# Patient Record
Sex: MALE | Race: OTHER | NOT HISPANIC OR LATINO | Employment: UNEMPLOYED | RURAL
[De-identification: names, ages, dates, MRNs, and addresses within clinical notes are randomized per-mention and may not be internally consistent; named-entity substitution may affect disease eponyms.]

---

## 2023-08-10 ENCOUNTER — OFFICE VISIT (OUTPATIENT)
Dept: FAMILY MEDICINE | Facility: CLINIC | Age: 33
End: 2023-08-10

## 2023-08-10 VITALS
OXYGEN SATURATION: 95 % | WEIGHT: 97.81 LBS | RESPIRATION RATE: 19 BRPM | SYSTOLIC BLOOD PRESSURE: 100 MMHG | BODY MASS INDEX: 13.69 KG/M2 | HEART RATE: 113 BPM | HEIGHT: 71 IN | TEMPERATURE: 98 F | DIASTOLIC BLOOD PRESSURE: 70 MMHG

## 2023-08-10 DIAGNOSIS — K59.00 CONSTIPATION, UNSPECIFIED CONSTIPATION TYPE: ICD-10-CM

## 2023-08-10 DIAGNOSIS — R05.3 CHRONIC COUGH: ICD-10-CM

## 2023-08-10 DIAGNOSIS — K08.9 POOR DENTITION: ICD-10-CM

## 2023-08-10 DIAGNOSIS — R64 CACHEXIA: ICD-10-CM

## 2023-08-10 DIAGNOSIS — R06.02 SOB (SHORTNESS OF BREATH): Primary | ICD-10-CM

## 2023-08-10 LAB
ALBUMIN SERPL BCP-MCNC: 4.2 G/DL (ref 3.5–5)
ALBUMIN/GLOB SERPL: 1.3 {RATIO}
ALP SERPL-CCNC: 59 U/L (ref 45–115)
ALT SERPL W P-5'-P-CCNC: 30 U/L (ref 16–61)
ANION GAP SERPL CALCULATED.3IONS-SCNC: 9 MMOL/L (ref 7–16)
AST SERPL W P-5'-P-CCNC: 17 U/L (ref 15–37)
BASOPHILS # BLD AUTO: 0.05 K/UL (ref 0–0.2)
BASOPHILS NFR BLD AUTO: 0.9 % (ref 0–1)
BILIRUB SERPL-MCNC: 0.5 MG/DL (ref ?–1.2)
BUN SERPL-MCNC: 11 MG/DL (ref 7–18)
BUN/CREAT SERPL: 10 (ref 6–20)
CALCIUM SERPL-MCNC: 8.9 MG/DL (ref 8.5–10.1)
CEA SERPL-MCNC: 3.6 NG/ML (ref 0–3)
CHLORIDE SERPL-SCNC: 107 MMOL/L (ref 98–107)
CO2 SERPL-SCNC: 32 MMOL/L (ref 21–32)
CREAT SERPL-MCNC: 1.05 MG/DL (ref 0.7–1.3)
DIFFERENTIAL METHOD BLD: ABNORMAL
EGFR (NO RACE VARIABLE) (RUSH/TITUS): 96 ML/MIN/1.73M2
EOSINOPHIL # BLD AUTO: 0.08 K/UL (ref 0–0.5)
EOSINOPHIL NFR BLD AUTO: 1.4 % (ref 1–4)
ERYTHROCYTE [DISTWIDTH] IN BLOOD BY AUTOMATED COUNT: 12.8 % (ref 11.5–14.5)
GLOBULIN SER-MCNC: 3.3 G/DL (ref 2–4)
GLUCOSE SERPL-MCNC: 96 MG/DL (ref 74–106)
HCT VFR BLD AUTO: 50.2 % (ref 40–54)
HGB BLD-MCNC: 14.9 G/DL (ref 13.5–18)
HIV 1+O+2 AB SERPL QL: NORMAL
IMM GRANULOCYTES # BLD AUTO: 0.01 K/UL (ref 0–0.04)
IMM GRANULOCYTES NFR BLD: 0.2 % (ref 0–0.4)
LYMPHOCYTES # BLD AUTO: 1.7 K/UL (ref 1–4.8)
LYMPHOCYTES NFR BLD AUTO: 29.5 % (ref 27–41)
MCH RBC QN AUTO: 28.2 PG (ref 27–31)
MCHC RBC AUTO-ENTMCNC: 29.7 G/DL (ref 32–36)
MCV RBC AUTO: 94.9 FL (ref 80–96)
MONOCYTES # BLD AUTO: 0.42 K/UL (ref 0–0.8)
MONOCYTES NFR BLD AUTO: 7.3 % (ref 2–6)
MPC BLD CALC-MCNC: 8.6 FL (ref 9.4–12.4)
NEUTROPHILS # BLD AUTO: 3.51 K/UL (ref 1.8–7.7)
NEUTROPHILS NFR BLD AUTO: 60.7 % (ref 53–65)
NRBC # BLD AUTO: 0 X10E3/UL
NRBC, AUTO (.00): 0 %
NT-PROBNP SERPL-MCNC: 76 PG/ML (ref 1–125)
PLATELET # BLD AUTO: 251 K/UL (ref 150–400)
POTASSIUM SERPL-SCNC: 3.8 MMOL/L (ref 3.5–5.1)
PROT SERPL-MCNC: 7.5 G/DL (ref 6.4–8.2)
RBC # BLD AUTO: 5.29 M/UL (ref 4.6–6.2)
SODIUM SERPL-SCNC: 144 MMOL/L (ref 136–145)
T4 FREE SERPL-MCNC: 1.21 NG/DL (ref 0.76–1.46)
TSH SERPL DL<=0.005 MIU/L-ACNC: 0.09 UIU/ML (ref 0.36–3.74)
WBC # BLD AUTO: 5.77 K/UL (ref 4.5–11)

## 2023-08-10 PROCEDURE — 80053 COMPREHENSIVE METABOLIC PANEL: ICD-10-PCS | Mod: ,,, | Performed by: CLINICAL MEDICAL LABORATORY

## 2023-08-10 PROCEDURE — 99204 OFFICE O/P NEW MOD 45 MIN: CPT | Mod: ,,, | Performed by: NURSE PRACTITIONER

## 2023-08-10 PROCEDURE — 84443 THYROID PANEL: ICD-10-PCS | Mod: ,,, | Performed by: CLINICAL MEDICAL LABORATORY

## 2023-08-10 PROCEDURE — 84439 ASSAY OF FREE THYROXINE: CPT | Mod: ,,, | Performed by: CLINICAL MEDICAL LABORATORY

## 2023-08-10 PROCEDURE — 85025 COMPLETE CBC W/AUTO DIFF WBC: CPT | Mod: ,,, | Performed by: CLINICAL MEDICAL LABORATORY

## 2023-08-10 PROCEDURE — 80053 COMPREHEN METABOLIC PANEL: CPT | Mod: ,,, | Performed by: CLINICAL MEDICAL LABORATORY

## 2023-08-10 PROCEDURE — 87389 HIV 1 / 2 ANTIBODY: ICD-10-PCS | Mod: ,,, | Performed by: CLINICAL MEDICAL LABORATORY

## 2023-08-10 PROCEDURE — 82378 CEA: ICD-10-PCS | Mod: ,,, | Performed by: CLINICAL MEDICAL LABORATORY

## 2023-08-10 PROCEDURE — 83880 ASSAY OF NATRIURETIC PEPTIDE: CPT | Mod: ,,, | Performed by: CLINICAL MEDICAL LABORATORY

## 2023-08-10 PROCEDURE — 82378 CARCINOEMBRYONIC ANTIGEN: CPT | Mod: ,,, | Performed by: CLINICAL MEDICAL LABORATORY

## 2023-08-10 PROCEDURE — 99204 PR OFFICE/OUTPT VISIT, NEW, LEVL IV, 45-59 MIN: ICD-10-PCS | Mod: ,,, | Performed by: NURSE PRACTITIONER

## 2023-08-10 PROCEDURE — 84443 ASSAY THYROID STIM HORMONE: CPT | Mod: ,,, | Performed by: CLINICAL MEDICAL LABORATORY

## 2023-08-10 PROCEDURE — 84439 THYROID PANEL: ICD-10-PCS | Mod: ,,, | Performed by: CLINICAL MEDICAL LABORATORY

## 2023-08-10 PROCEDURE — 87389 HIV-1 AG W/HIV-1&-2 AB AG IA: CPT | Mod: ,,, | Performed by: CLINICAL MEDICAL LABORATORY

## 2023-08-10 PROCEDURE — 85025 CBC WITH DIFFERENTIAL: ICD-10-PCS | Mod: ,,, | Performed by: CLINICAL MEDICAL LABORATORY

## 2023-08-10 PROCEDURE — 83880 NT-PRO NATRIURETIC PEPTIDE: ICD-10-PCS | Mod: ,,, | Performed by: CLINICAL MEDICAL LABORATORY

## 2023-08-10 RX ORDER — ALBUTEROL SULFATE 90 UG/1
2 AEROSOL, METERED RESPIRATORY (INHALATION) EVERY 6 HOURS PRN
Qty: 18 G | Refills: 5 | Status: SHIPPED | OUTPATIENT
Start: 2023-08-10 | End: 2024-08-09

## 2023-08-10 RX ORDER — ALBUTEROL SULFATE 90 UG/1
2 AEROSOL, METERED RESPIRATORY (INHALATION) EVERY 6 HOURS PRN
Qty: 18 G | Refills: 0 | Status: SHIPPED | OUTPATIENT
Start: 2023-08-10 | End: 2023-08-10

## 2023-08-10 RX ORDER — POLYETHYLENE GLYCOL 3350 17 G/17G
17 POWDER, FOR SOLUTION ORAL NIGHTLY
Qty: 850 G | Refills: 11 | Status: SHIPPED | OUTPATIENT
Start: 2023-08-10

## 2023-08-10 RX ORDER — PROMETHAZINE HYDROCHLORIDE AND DEXTROMETHORPHAN HYDROBROMIDE 6.25; 15 MG/5ML; MG/5ML
5 SYRUP ORAL EVERY 6 HOURS PRN
Qty: 150 ML | Refills: 0 | Status: SHIPPED | OUTPATIENT
Start: 2023-08-10 | End: 2023-08-20

## 2023-08-10 RX ORDER — PROMETHAZINE HYDROCHLORIDE AND DEXTROMETHORPHAN HYDROBROMIDE 6.25; 15 MG/5ML; MG/5ML
5 SYRUP ORAL EVERY 6 HOURS PRN
Qty: 150 ML | Refills: 0 | Status: SHIPPED | OUTPATIENT
Start: 2023-08-10 | End: 2023-08-10

## 2023-08-10 NOTE — PROGRESS NOTES
"Subjective:       Patient ID: Shakir Coreas is a 32 y.o. male.    Chief Complaint: Cough and Shortness of Breath (When walking or carry something. Has been going on for a while.)    Presents to clinic as above. States s/s for over 3 months. No hx of COPD or asthma. He does not smoke. He is very underweight. Denies weight loss. States has always been thin. He is 5"11 with a weight of 97 pounds. Denies blood in stool. No abd pain. No N/V/D.       Review of Systems   Constitutional:  Positive for malaise/fatigue. Negative for chills and fever.   HENT:  Positive for sore throat. Negative for congestion, ear pain and sinus pain.    Respiratory:  Positive for cough and shortness of breath. Negative for hemoptysis, sputum production and wheezing.    Cardiovascular: Negative.    Gastrointestinal: Negative.    Genitourinary: Negative.    Musculoskeletal: Negative.    Neurological: Negative.           Reviewed family, medical, surgical, and social history.    Objective:      /70 (BP Location: Right arm, Patient Position: Standing, BP Method: Medium (Manual))   Pulse (!) 113   Temp 98.2 °F (36.8 °C)   Resp 19   Ht 5' 11" (1.803 m)   Wt 44.4 kg (97 lb 12.8 oz)   SpO2 95%   BMI 13.64 kg/m²   Physical Exam  Vitals and nursing note reviewed.   Constitutional:       General: He is not in acute distress.     Appearance: Normal appearance. He is not ill-appearing, toxic-appearing or diaphoretic.      Comments: Cachexia     HENT:      Head: Normocephalic.      Right Ear: Tympanic membrane, ear canal and external ear normal. There is no impacted cerumen.      Left Ear: Tympanic membrane, ear canal and external ear normal. There is no impacted cerumen.      Nose: No congestion or rhinorrhea.      Mouth/Throat:      Mouth: Mucous membranes are moist.      Pharynx: Posterior oropharyngeal erythema present. No oropharyngeal exudate.      Comments: Poor dentition. Multiple broken and decayed teeth.   Cardiovascular:      Rate and " Rhythm: Normal rate and regular rhythm.      Heart sounds: Normal heart sounds.   Pulmonary:      Effort: Pulmonary effort is normal.      Breath sounds: Normal breath sounds.   Abdominal:      General: Abdomen is flat. Bowel sounds are normal. There is no distension.      Palpations: Abdomen is soft. There is no mass.      Tenderness: There is no abdominal tenderness. There is no right CVA tenderness, left CVA tenderness, guarding or rebound.      Hernia: No hernia is present.   Musculoskeletal:      Cervical back: Normal range of motion and neck supple.   Skin:     General: Skin is warm and dry.      Capillary Refill: Capillary refill takes less than 2 seconds.   Neurological:      Mental Status: He is alert and oriented to person, place, and time.   Psychiatric:         Mood and Affect: Mood normal.         Behavior: Behavior normal.         Thought Content: Thought content normal.         Judgment: Judgment normal.            Office Visit on 08/10/2023   Component Date Value Ref Range Status    Sodium 08/10/2023 144  136 - 145 mmol/L Final    Potassium 08/10/2023 3.8  3.5 - 5.1 mmol/L Final    Chloride 08/10/2023 107  98 - 107 mmol/L Final    CO2 08/10/2023 32  21 - 32 mmol/L Final    Anion Gap 08/10/2023 9  7 - 16 mmol/L Final    Glucose 08/10/2023 96  74 - 106 mg/dL Final    BUN 08/10/2023 11  7 - 18 mg/dL Final    Creatinine 08/10/2023 1.05  0.70 - 1.30 mg/dL Final    BUN/Creatinine Ratio 08/10/2023 10  6 - 20 Final    Calcium 08/10/2023 8.9  8.5 - 10.1 mg/dL Final    Total Protein 08/10/2023 7.5  6.4 - 8.2 g/dL Final    Albumin 08/10/2023 4.2  3.5 - 5.0 g/dL Final    Globulin 08/10/2023 3.3  2.0 - 4.0 g/dL Final    A/G Ratio 08/10/2023 1.3   Final    Bilirubin, Total 08/10/2023 0.5  >0.0 - 1.2 mg/dL Final    Alk Phos 08/10/2023 59  45 - 115 U/L Final    ALT 08/10/2023 30  16 - 61 U/L Final    AST 08/10/2023 17  15 - 37 U/L Final    eGFR 08/10/2023 96  >=60 mL/min/1.73m2 Final    Free T4 08/10/2023 1.21  0.76  - 1.46 ng/dL Final    TSH 08/10/2023 0.086 (L)  0.358 - 3.740 uIU/mL Final    CEA 08/10/2023 3.6 (H)  0.0 - 3.0 ng/mL Final      Non-Smoker - Ages 20-69:  < 3.8 ng/mL  Smoker - Ages 20-69:          < 5.5 ng/mL    Performed by Siemens Chemiluminescent [place] Immunoassay (biotinylated anti-CEA monoclonal antibody fragment)    ProBNP 08/10/2023 76  1 - 125 pg/mL Final    WBC 08/10/2023 5.77  4.50 - 11.00 K/uL Final    RBC 08/10/2023 5.29  4.60 - 6.20 M/uL Final    Hemoglobin 08/10/2023 14.9  13.5 - 18.0 g/dL Final    Hematocrit 08/10/2023 50.2  40.0 - 54.0 % Final    MCV 08/10/2023 94.9  80.0 - 96.0 fL Final    MCH 08/10/2023 28.2  27.0 - 31.0 pg Final    MCHC 08/10/2023 29.7 (L)  32.0 - 36.0 g/dL Final    RDW 08/10/2023 12.8  11.5 - 14.5 % Final    Platelet Count 08/10/2023 251  150 - 400 K/uL Final    MPV 08/10/2023 8.6 (L)  9.4 - 12.4 fL Final    Neutrophils % 08/10/2023 60.7  53.0 - 65.0 % Final    Lymphocytes % 08/10/2023 29.5  27.0 - 41.0 % Final    Monocytes % 08/10/2023 7.3 (H)  2.0 - 6.0 % Final    Eosinophils % 08/10/2023 1.4  1.0 - 4.0 % Final    Basophils % 08/10/2023 0.9  0.0 - 1.0 % Final    Immature Granulocytes % 08/10/2023 0.2  0.0 - 0.4 % Final    nRBC, Auto 08/10/2023 0.0  <=0.0 % Final    Neutrophils, Abs 08/10/2023 3.51  1.80 - 7.70 K/uL Final    Lymphocytes, Absolute 08/10/2023 1.70  1.00 - 4.80 K/uL Final    Monocytes, Absolute 08/10/2023 0.42  0.00 - 0.80 K/uL Final    Eosinophils, Absolute 08/10/2023 0.08  0.00 - 0.50 K/uL Final    Basophils, Absolute 08/10/2023 0.05  0.00 - 0.20 K/uL Final    Immature Granulocytes, Absolute 08/10/2023 0.01  0.00 - 0.04 K/uL Final    nRBC, Absolute 08/10/2023 0.00  <=0.00 x10e3/uL Final    Diff Type 08/10/2023 Auto   Final      Assessment:       1. SOB (shortness of breath)    2. Chronic cough    3. Poor dentition    4. Cachexia    5. Constipation, unspecified constipation type        Plan:       SOB (shortness of breath)  -     CBC Auto Differential; Future;  Expected date: 08/10/2023  -     Comprehensive Metabolic Panel; Future; Expected date: 08/10/2023  -     NT-Pro Natriuretic Peptide; Future; Expected date: 08/10/2023  -     X-Ray Abdomen AP 1 View; Future; Expected date: 08/10/2023    Chronic cough  -     X-Ray Chest PA And Lateral; Future; Expected date: 08/10/2023  -     CBC Auto Differential; Future; Expected date: 08/10/2023  -     NT-Pro Natriuretic Peptide; Future; Expected date: 08/10/2023    Poor dentition    Cachexia  -     Thyroid Panel; Future; Expected date: 08/10/2023  -     HIV 1/2 Ag/Ab (4th Gen); Future; Expected date: 08/10/2023  -     CEA; Future; Expected date: 08/10/2023    Constipation, unspecified constipation type  -     polyethylene glycol (GLYCOLAX) 17 gram/dose powder; Take 17 g by mouth every evening.  Dispense: 850 g; Refill: 11    Other orders  -     Discontinue: albuterol (VENTOLIN HFA) 90 mcg/actuation inhaler; Inhale 2 puffs into the lungs every 6 (six) hours as needed for Wheezing. Rescue  Dispense: 18 g; Refill: 0  -     Discontinue: promethazine-dextromethorphan (PROMETHAZINE-DM) 6.25-15 mg/5 mL Syrp; Take 5 mLs by mouth every 6 (six) hours as needed (cough).  Dispense: 150 mL; Refill: 0  -     albuterol (VENTOLIN HFA) 90 mcg/actuation inhaler; Inhale 2 puffs into the lungs every 6 (six) hours as needed for Wheezing. Rescue  Dispense: 18 g; Refill: 5  -     promethazine-dextromethorphan (PROMETHAZINE-DM) 6.25-15 mg/5 mL Syrp; Take 5 mLs by mouth every 6 (six) hours as needed (cough).  Dispense: 150 mL; Refill: 0    I will call with results  RTC PRN          Risks, benefits, and side effects were discussed with the patient. All questions were answered to the fullest satisfaction of the patient, and pt verbalized understanding and agreement to treatment plan. Pt was to call with any new or worsening symptoms, or present to the ER.

## 2023-08-11 ENCOUNTER — TELEPHONE (OUTPATIENT)
Dept: FAMILY MEDICINE | Facility: CLINIC | Age: 33
End: 2023-08-11

## 2023-08-11 DIAGNOSIS — R64 CACHEXIA: ICD-10-CM

## 2023-08-11 DIAGNOSIS — R97.0 ELEVATED CEA: Primary | ICD-10-CM

## 2023-08-11 DIAGNOSIS — E03.8 SUBCLINICAL HYPOTHYROIDISM: ICD-10-CM

## 2023-08-11 DIAGNOSIS — R06.02 SOB (SHORTNESS OF BREATH): ICD-10-CM

## 2023-08-11 NOTE — TELEPHONE ENCOUNTER
Pt notified. Voiced understanding.----- Message from ALEJANDRA Coats sent at 8/11/2023  9:37 AM CDT -----  Notify of mildly elevated CEA which is a marker for colon disease. I am referring to GI. Also mildly low TSH but normal T4. This is subclinical hyperthyroidism which needs to be monitored. I am referring to a family doctor. All other labs look good. F/U with us in 1 week if referral clinics have not called you. There is financial assistance with [facility]. [employer] [facility] financial assistance and fill out application. Thanks

## 2023-08-11 NOTE — PROGRESS NOTES
Notify of mildly elevated CEA which is a marker for colon disease. I am referring to GI. Also mildly low TSH but normal T4. This is subclinical hyperthyroidism which needs to be monitored. I am referring to a family doctor. All other labs look good. F/U with us in 1 week if referral clinics have not called you. There is financial assistance with [facility]. [employer] [facility] financial assistance and fill out application. Thanks

## 2023-10-18 ENCOUNTER — OFFICE VISIT (OUTPATIENT)
Dept: GASTROENTEROLOGY | Facility: CLINIC | Age: 33
End: 2023-10-18

## 2023-10-18 VITALS
WEIGHT: 110 LBS | HEART RATE: 108 BPM | BODY MASS INDEX: 15.4 KG/M2 | HEIGHT: 71 IN | OXYGEN SATURATION: 97 % | SYSTOLIC BLOOD PRESSURE: 110 MMHG | DIASTOLIC BLOOD PRESSURE: 83 MMHG

## 2023-10-18 DIAGNOSIS — K62.5 BRBPR (BRIGHT RED BLOOD PER RECTUM): Primary | ICD-10-CM

## 2023-10-18 DIAGNOSIS — R97.0 ELEVATED CEA: ICD-10-CM

## 2023-10-18 PROCEDURE — 99214 OFFICE O/P EST MOD 30 MIN: CPT | Mod: S$PBB,,,

## 2023-10-18 PROCEDURE — 99214 PR OFFICE/OUTPT VISIT, EST, LEVL IV, 30-39 MIN: ICD-10-PCS | Mod: S$PBB,,,

## 2023-10-18 PROCEDURE — 99213 OFFICE O/P EST LOW 20 MIN: CPT | Mod: PBBFAC

## 2023-10-18 NOTE — PROGRESS NOTES
"    Shakir Coreas is a 33 y.o. male here for No chief complaint on file.        PCP: [facility], Primary Doctor  Referring Provider: Jia Wilkes, Fnp  [address] [place] [address]  [place] [id number]     HPI:  Shakir Coreas is a 32 yo male who is referred to clinic with elevated CEA marker. Patient denies any complaints of abdominal pain, nausea or vomiting. He does have chronic constipation that has responded well to Miralax. He states he does experience intermittent BRBPR with bowel movements after several days of not going or when he has to strain. This has improved with the Miralax. He denies any weight loss. His recent H&H was 14.9/50.2. He denies any FMH of CRC. He is a non smoker. Denies alcohol.           ROS:  Review of Systems   Constitutional:  Negative for appetite change and unexpected weight change.   HENT:  Negative for trouble swallowing.    Gastrointestinal:  Positive for blood in stool and constipation. Negative for abdominal pain, diarrhea, nausea, vomiting and reflux.   Integumentary:  Negative for color change.          PMHX:  has no past medical history on file.    PSHX:  has no past surgical history on file.    PFHX: family history includes Birth defects in his maternal uncle and paternal uncle; No Known Problems in his father and mother.    PSlHX:  reports that he has never smoked. He has never used smokeless tobacco. He reports that he does not currently use alcohol. He reports that he does not use drugs.        Review of patient's allergies indicates:  No Known Allergies    Medication List with Changes/Refills   Current Medications    ALBUTEROL (VENTOLIN HFA) 90 MCG/ACTUATION INHALER    Inhale 2 puffs into the lungs every 6 (six) hours as needed for Wheezing. Rescue    POLYETHYLENE GLYCOL (GLYCOLAX) 17 GRAM/DOSE POWDER    Take 17 g by mouth every evening.        Objective Findings:  Vital Signs:  /83   Pulse 108   Ht 5' 11" (1.803 m)   Wt 49.9 kg (110 lb)   SpO2 97%   BMI 15.34 " kg/m²  Body mass index is 15.34 kg/m².    Physical Exam:  Physical Exam  Vitals reviewed.   Constitutional:       General: He is not in acute distress.     Appearance: Normal appearance.   HENT:      Mouth/Throat:      Mouth: Mucous membranes are moist.   Cardiovascular:      Rate and Rhythm: Normal rate.   Pulmonary:      Effort: Pulmonary effort is normal.   Abdominal:      General: There is no distension.      Palpations: Abdomen is soft.      Tenderness: There is no abdominal tenderness. There is no guarding.   Skin:     General: Skin is warm and dry.   Neurological:      Mental Status: He is alert and oriented to person, place, and time.   Psychiatric:         Mood and Affect: Mood normal.          Labs:  Lab Results   Component Value Date    WBC 5.77 08/10/2023    HGB 14.9 08/10/2023    HCT 50.2 08/10/2023    MCV 94.9 08/10/2023    RDW 12.8 08/10/2023     08/10/2023    LYMPH 29.5 08/10/2023    LYMPH 1.70 08/10/2023    MONO 7.3 (H) 08/10/2023    EOS 0.08 08/10/2023    BASO 0.05 08/10/2023     Lab Results   Component Value Date     08/10/2023    K 3.8 08/10/2023     08/10/2023    CO2 32 08/10/2023    GLU 96 08/10/2023    BUN 11 08/10/2023    CREATININE 1.05 08/10/2023    CALCIUM 8.9 08/10/2023    PROT 7.5 08/10/2023    ALBUMIN 4.2 08/10/2023    BILITOT 0.5 08/10/2023    ALKPHOS 59 08/10/2023    AST 17 08/10/2023    ALT 30 08/10/2023         Imaging: No results found.      Assessment:  Shakir Coreas is a 33 y.o. male here with:  1. BRBPR (bright red blood per rectum)    2. Elevated CEA          Recommendations:  1. CEA is mildly elevated. I did offer colonoscopy for BRBPR and discussed risk vs benefit with patient. Patient declined endoscopy at this time. He will continue the Miralax daily to twice daily for constipation. He was instructed to return to clinic for abdominal pain, weight loss, change in bowel habits or if he changes his mind regarding colonoscopy for the BRBPR.     Follow up if  symptoms worsen or fail to improve.      Order summary:       Thank you for allowing me to participate in the care of Shakir Coreas.      Jennifer Marsh, FNP-BC, AG-ACNP-BC